# Patient Record
Sex: FEMALE | Race: WHITE | NOT HISPANIC OR LATINO | URBAN - METROPOLITAN AREA
[De-identification: names, ages, dates, MRNs, and addresses within clinical notes are randomized per-mention and may not be internally consistent; named-entity substitution may affect disease eponyms.]

---

## 2022-07-12 ENCOUNTER — WEB ENCOUNTER (OUTPATIENT)
Dept: URBAN - METROPOLITAN AREA CLINIC 88 | Facility: CLINIC | Age: 37
End: 2022-07-12

## 2022-07-13 ENCOUNTER — LAB OUTSIDE AN ENCOUNTER (OUTPATIENT)
Dept: URBAN - METROPOLITAN AREA CLINIC 88 | Facility: CLINIC | Age: 37
End: 2022-07-13

## 2022-07-13 ENCOUNTER — DASHBOARD ENCOUNTERS (OUTPATIENT)
Age: 37
End: 2022-07-13

## 2022-07-13 ENCOUNTER — OFFICE VISIT (OUTPATIENT)
Dept: URBAN - METROPOLITAN AREA CLINIC 88 | Facility: CLINIC | Age: 37
End: 2022-07-13
Payer: COMMERCIAL

## 2022-07-13 VITALS
WEIGHT: 120 LBS | DIASTOLIC BLOOD PRESSURE: 84 MMHG | HEIGHT: 60 IN | HEART RATE: 82 BPM | TEMPERATURE: 98.1 F | BODY MASS INDEX: 23.56 KG/M2 | SYSTOLIC BLOOD PRESSURE: 137 MMHG

## 2022-07-13 DIAGNOSIS — Z86.16 HISTORY OF COVID-19: ICD-10-CM

## 2022-07-13 DIAGNOSIS — K59.00 COLONIC CONSTIPATION: ICD-10-CM

## 2022-07-13 DIAGNOSIS — R10.30 LOWER ABDOMINAL PAIN: ICD-10-CM

## 2022-07-13 DIAGNOSIS — R11.0 NAUSEA: ICD-10-CM

## 2022-07-13 DIAGNOSIS — R63.4 UNEXPLAINED WEIGHT LOSS: ICD-10-CM

## 2022-07-13 DIAGNOSIS — R19.4 CHANGE IN BOWEL HABIT: ICD-10-CM

## 2022-07-13 PROCEDURE — 99204 OFFICE O/P NEW MOD 45 MIN: CPT | Performed by: NURSE PRACTITIONER

## 2022-07-13 RX ORDER — MELOXICAM 15 MG/1
1 TABLET TABLET ORAL ONCE A DAY
Status: ACTIVE | COMMUNITY

## 2022-07-13 RX ORDER — CLONAZEPAM 0.5 MG/1
1 TABLET AT BEDTIME TABLET ORAL ONCE A DAY
Status: ACTIVE | COMMUNITY

## 2022-07-13 RX ORDER — DEXTROAMPHETAMINE SACCHARATE, AMPHETAMINE ASPARTATE, DEXTROAMPHETAMINE SULFATE, AND AMPHETAMINE SULFATE 7.5; 7.5; 7.5; 7.5 MG/1; MG/1; MG/1; MG/1
1 TABLET TABLET ORAL TWICE A DAY
Status: ACTIVE | COMMUNITY

## 2022-07-13 RX ORDER — ONDANSETRON 4 MG/1
1 TABLET ON THE TONGUE AND ALLOW TO DISSOLVE TABLET, ORALLY DISINTEGRATING ORAL
Qty: 14 | Refills: 0 | OUTPATIENT
Start: 2022-07-13

## 2022-07-13 RX ORDER — PANTOPRAZOLE SODIUM 40 MG/1
1 TABLET TABLET, DELAYED RELEASE ORAL
Qty: 90 | Refills: 1 | OUTPATIENT
Start: 2022-07-13

## 2022-07-13 RX ORDER — SERTRALINE HCL 100 MG
TABLET ORAL
Qty: 0 | Refills: 0 | Status: ACTIVE | COMMUNITY
Start: 1900-01-01

## 2022-07-13 RX ORDER — GABAPENTIN 100 MG/1
2 TABLETS CAPSULE ORAL ONCE A DAY
Refills: 0 | Status: ACTIVE | COMMUNITY
Start: 1900-01-01

## 2022-07-13 RX ORDER — QUETIAPINE 100 MG/1
1 TABLET AT BEDTIME TABLET, FILM COATED ORAL ONCE A DAY
Refills: 0 | Status: ACTIVE | COMMUNITY
Start: 1900-01-01

## 2022-07-13 RX ORDER — HYOSCYAMINE SULFATE 0.12 MG/5ML
5 ML AS NEEDED LIQUID ORAL
Status: ACTIVE | COMMUNITY

## 2022-07-13 RX ORDER — BUPROPION HCL 150 MG
TABLET, EXTENDED RELEASE 24 HR ORAL
Qty: 0 | Refills: 0 | Status: DISCONTINUED | COMMUNITY
Start: 1900-01-01

## 2022-07-13 RX ORDER — CARBAMAZEPINE 200 MG/1
1 TABLET TABLET ORAL THREE TIMES A DAY
Status: ACTIVE | COMMUNITY

## 2022-07-13 NOTE — PHYSICAL EXAM GASTROINTESTINAL
Abdomen , soft, epigastric, LUQ and umbilical tenderness, nondistended , no guarding or rigidity , no masses palpable , normal bowel sounds , Liver and Spleen: no hepatosplenomegaly

## 2022-07-13 NOTE — HPI-TODAY'S VISIT:
Patient presents today, along with spouse, for evalution of weight loss.  Was diagnosed with COVID in September 2021.  After this started to loose weight.  About 4 months ago, weight dropped to lowest weight of 106 lbs (normal weight prior to Covide was 150 lbs).  She is slowly starting to regain some of her weight.  Currently she voices abdominal pain, nausea w/o vomiting, increased fatigue and dizziness.  Abdominal pain is generalized sensation that is described as a sharp to stabbing pain.  This is more of a weekly complaint, rather than daily complaint.  Eating leads to early satiety and experiences a loss of appetite.  As result, eating 1-2 meals a day.  Denies dysphagia, signs of melena, rectal bleeding, vomiting. Defecation occurs every 2-3 days with stools ranging from loose to firm stools.  Fails to achieve a complete sense of evacuation.  Experiences lower abdominal pain that radiates into vaginal area.  Describes as intense pain that occurs prior to defecation.  Once defecation occurs, this pain gradually resolves.  Denies daily use of medication to help improve her bowel habits.  This has increased since COVID.    Last MRI Abd w/wo+ MRCP was in October 2019.  Findings:  minimal intrahepatic biliary ductal dilatation that appeared mildly irregular, mild prominence of extrahepatic common duct.  Findings concern PSC without signs of tumors or stones in bile duct.  EUS recommended but patient switched insurance and was unable to proceed to procedure. Labs 04/25/2022 (viewed via patient's phone):  WBC 6.55, Hgb 12.59, MCV 91.1, .4, AST 15, ALT 16, Alk Phos 45, TB 0.3, Albumin 4.8, normal TSH, .  LFTs were also normal in February 2022.

## 2022-07-14 PROBLEM — 35298007: Status: ACTIVE | Noted: 2022-07-13

## 2022-07-14 PROBLEM — 88111009 CHANGE IN BOWEL HABIT: Status: ACTIVE | Noted: 2022-07-14

## 2022-07-14 PROBLEM — 422868009 UNEXPLAINED WEIGHT LOSS: Status: ACTIVE | Noted: 2022-07-14

## 2022-07-14 PROBLEM — 292508471000119105 HISTORY OF COVID-19: Status: ACTIVE | Noted: 2022-07-14

## 2022-07-14 PROBLEM — 54586004 LOWER ABDOMINAL PAIN: Status: ACTIVE | Noted: 2022-07-14

## 2022-07-14 PROBLEM — 422587007 NAUSEA: Status: ACTIVE | Noted: 2022-07-14

## 2022-08-16 ENCOUNTER — OFFICE VISIT (OUTPATIENT)
Dept: URBAN - METROPOLITAN AREA SURGERY CENTER 24 | Facility: SURGERY CENTER | Age: 37
End: 2022-08-16
Payer: COMMERCIAL

## 2022-08-16 DIAGNOSIS — R11.2 ACUTE NAUSEA WITH NONBILIOUS VOMITING: ICD-10-CM

## 2022-08-16 DIAGNOSIS — R19.4 CHANGE IN BOWEL HABIT: ICD-10-CM

## 2022-08-16 DIAGNOSIS — R63.4 UNEXPLAINED WEIGHT LOSS: ICD-10-CM

## 2022-08-16 DIAGNOSIS — K29.60 ADENOPAPILLOMATOSIS GASTRICA: ICD-10-CM

## 2022-08-16 PROCEDURE — 45378 DIAGNOSTIC COLONOSCOPY: CPT | Performed by: INTERNAL MEDICINE

## 2022-08-16 PROCEDURE — G8907 PT DOC NO EVENTS ON DISCHARG: HCPCS | Performed by: INTERNAL MEDICINE

## 2022-08-16 PROCEDURE — 43239 EGD BIOPSY SINGLE/MULTIPLE: CPT | Performed by: INTERNAL MEDICINE

## 2022-08-16 RX ORDER — MELOXICAM 15 MG/1
1 TABLET TABLET ORAL ONCE A DAY
Status: ACTIVE | COMMUNITY

## 2022-08-16 RX ORDER — PANTOPRAZOLE SODIUM 40 MG/1
1 TABLET TABLET, DELAYED RELEASE ORAL
Qty: 90 | Refills: 1 | Status: ACTIVE | COMMUNITY
Start: 2022-07-13

## 2022-08-16 RX ORDER — GABAPENTIN 100 MG/1
2 TABLETS CAPSULE ORAL ONCE A DAY
Refills: 0 | Status: ACTIVE | COMMUNITY
Start: 1900-01-01

## 2022-08-16 RX ORDER — DEXTROAMPHETAMINE SACCHARATE, AMPHETAMINE ASPARTATE, DEXTROAMPHETAMINE SULFATE, AND AMPHETAMINE SULFATE 7.5; 7.5; 7.5; 7.5 MG/1; MG/1; MG/1; MG/1
1 TABLET TABLET ORAL TWICE A DAY
Status: ACTIVE | COMMUNITY

## 2022-08-16 RX ORDER — CLONAZEPAM 0.5 MG/1
1 TABLET AT BEDTIME TABLET ORAL ONCE A DAY
Status: ACTIVE | COMMUNITY

## 2022-08-16 RX ORDER — SERTRALINE HCL 100 MG
TABLET ORAL
Qty: 0 | Refills: 0 | Status: ACTIVE | COMMUNITY
Start: 1900-01-01

## 2022-08-16 RX ORDER — ONDANSETRON 4 MG/1
1 TABLET ON THE TONGUE AND ALLOW TO DISSOLVE TABLET, ORALLY DISINTEGRATING ORAL
Qty: 14 | Refills: 0 | Status: ACTIVE | COMMUNITY
Start: 2022-07-13

## 2022-08-16 RX ORDER — HYOSCYAMINE SULFATE 0.12 MG/5ML
5 ML AS NEEDED LIQUID ORAL
Status: ACTIVE | COMMUNITY

## 2022-08-16 RX ORDER — QUETIAPINE 100 MG/1
1 TABLET AT BEDTIME TABLET, FILM COATED ORAL ONCE A DAY
Refills: 0 | Status: ACTIVE | COMMUNITY
Start: 1900-01-01

## 2022-08-16 RX ORDER — CARBAMAZEPINE 200 MG/1
1 TABLET TABLET ORAL THREE TIMES A DAY
Status: ACTIVE | COMMUNITY

## 2022-08-26 ENCOUNTER — ERX REFILL RESPONSE (OUTPATIENT)
Dept: URBAN - METROPOLITAN AREA CLINIC 70 | Facility: CLINIC | Age: 37
End: 2022-08-26

## 2022-08-26 RX ORDER — ONDANSETRON 4 MG/1
DISSOLVE 1 TABLET IN MOUTH TWICE DAILY AS NEEDED FOR NAUSEA FOR 7 DAYS TABLET, ORALLY DISINTEGRATING ORAL
Qty: 14 TABLET | Refills: 0 | OUTPATIENT

## 2022-08-26 RX ORDER — ONDANSETRON 4 MG/1
1 TABLET ON THE TONGUE AND ALLOW TO DISSOLVE TABLET, ORALLY DISINTEGRATING ORAL
Qty: 14 | Refills: 0 | OUTPATIENT

## 2023-03-22 ENCOUNTER — TELEPHONE ENCOUNTER (OUTPATIENT)
Dept: URBAN - METROPOLITAN AREA CLINIC 88 | Facility: CLINIC | Age: 38
End: 2023-03-22

## 2023-03-22 RX ORDER — ONDANSETRON 4 MG/1
DISSOLVE 1 TABLET IN MOUTH TWICE DAILY AS NEEDED FOR NAUSEA FOR 7 DAYS TABLET, ORALLY DISINTEGRATING ORAL
Qty: 14 TABLET | Refills: 0